# Patient Record
Sex: MALE | Race: WHITE | NOT HISPANIC OR LATINO
[De-identification: names, ages, dates, MRNs, and addresses within clinical notes are randomized per-mention and may not be internally consistent; named-entity substitution may affect disease eponyms.]

---

## 2021-04-05 ENCOUNTER — TRANSCRIPTION ENCOUNTER (OUTPATIENT)
Age: 56
End: 2021-04-05

## 2023-01-24 ENCOUNTER — OUTPATIENT (OUTPATIENT)
Dept: OUTPATIENT SERVICES | Facility: HOSPITAL | Age: 58
LOS: 1 days | Discharge: HOME | End: 2023-01-24
Payer: COMMERCIAL

## 2023-02-06 PROCEDURE — 90792 PSYCH DIAG EVAL W/MED SRVCS: CPT

## 2023-02-06 NOTE — ECT CONSULT NOTE - NSECTREASONCONSCOMMENTS_PSY_ALL_CORE
Pt interviewed via Teams telemed platform. Pt was at sister's house ([place]) who remained in room with his consent and MD was in private home/office.  Pt consented to video consult.

## 2023-02-06 NOTE — ECT CONSULT NOTE - NSECTASSESSRECOMM_PSY_ALL_CORE
A course of ECT is indicated for severe depression that has not resolved with trials of multiple medications.  Pt listened to info about ECT and understands risks/benefits including INCREASED RISK with current etoh use pattern.   Pt agrees to try stop drinking for 1 week (understanding he if has any symptoms of etoh withdrawal to contact PMD, psychiatrist and/or go to ER for medical attention).   If he is able to abstain for 1 week, he can go for medical clearance but expectation is that he would not drink (or at most 1 beer, not nights) during acute course of ECT.   Pt agrees to work with this plan.    The patient encounter was from 3:00p to 4:30p      More than 50% of the time was spent in counselling and/or coordination of care, including but not limited to discussing with patient and family risk and benefits of ECT, the usual trajectory of response with acute course of ECT, obtaining informed consent for ECT, reviewing ECT fasting guidelines, reviewing the need for periodic history and physical and labwork. Patient was asked to obtain labwork (CBC, BMP), EKG and medical clearance.  Referring psychiatrist was contacted.

## 2023-02-06 NOTE — ECT CONSULT NOTE - NSECTMENTALSTATUSEXAM_PSY_ALL_CORE
Conscious, cooperative, alert.   No psychomotor agitation/retardation.   Good eye contact.   Speech: regular rate and rhythm,   Mood: "Depressed" Affect: appropriate, full range.   Thought Process: linear, goal directed   Thought Content: No Death wish, Passive Suicidal ideation without intent/plan, No homicidal ideation/intent/plan. No delusions   Perception: Intermittent A/V hallucinations of father  Insight and Judgement: fair  Impulse Control: fair at this time.

## 2023-02-06 NOTE — ECT CONSULT NOTE - NSECTBILLINGCODES_PSY_ALL_CORE
81895 Psychiatric diagnostic evaluation with medical services [id number] Psychiatric diagnostic evaluation with medical services [id number] Psychiatric diagnostic evaluation with medical services
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
## 2023-02-06 NOTE — ECT CONSULT NOTE - CURRENT MEDICATION
MEDICATIONS  (STANDING): Rexulti 1mg daily, Cymbalta 120mg daily, Remeron 30mg at bed, Strattera ?mg daily (ran out 1 week ago, awaiting for Rx to be mailed, Lisinopril 10mg daily, Metoprolol 25mg daily, Omerprazole 40m daily, Flomax 0.4mg daily    No Benzos.

## 2023-02-06 NOTE — ECT CONSULT NOTE - DESCRIPTION
HTN, COPD (no h/o sleep study for RADHA but snores and gasps at times), Barretts Esophagus from GERD (not etoh per pt; no h/o known bleeds), enlarged prostate  NKDA  Dentita intact/stable

## 2023-03-28 LAB
ALBUMIN SERPL ELPH-MCNC: 4.8 G/DL — SIGNIFICANT CHANGE UP (ref 3.3–5)
ALP SERPL-CCNC: 92 U/L — SIGNIFICANT CHANGE UP (ref 40–120)
ALT FLD-CCNC: 41 U/L — SIGNIFICANT CHANGE UP (ref 4–41)
ANION GAP SERPL CALC-SCNC: 12 MMOL/L — SIGNIFICANT CHANGE UP (ref 7–14)
AST SERPL-CCNC: 29 U/L — SIGNIFICANT CHANGE UP (ref 4–40)
BASOPHILS # BLD AUTO: 0.06 K/UL — SIGNIFICANT CHANGE UP (ref 0–0.2)
BASOPHILS NFR BLD AUTO: 1 % — SIGNIFICANT CHANGE UP (ref 0–2)
BILIRUB SERPL-MCNC: 0.6 MG/DL — SIGNIFICANT CHANGE UP (ref 0.2–1.2)
BUN SERPL-MCNC: 11 MG/DL — SIGNIFICANT CHANGE UP (ref 7–23)
CALCIUM SERPL-MCNC: 10.1 MG/DL — SIGNIFICANT CHANGE UP (ref 8.4–10.5)
CHLORIDE SERPL-SCNC: 103 MMOL/L — SIGNIFICANT CHANGE UP (ref 98–107)
CO2 SERPL-SCNC: 26 MMOL/L — SIGNIFICANT CHANGE UP (ref 22–31)
CREAT SERPL-MCNC: 0.91 MG/DL — SIGNIFICANT CHANGE UP (ref 0.5–1.3)
EGFR: 98 ML/MIN/1.73M2 — SIGNIFICANT CHANGE UP
EOSINOPHIL # BLD AUTO: 0 K/UL — SIGNIFICANT CHANGE UP (ref 0–0.5)
EOSINOPHIL NFR BLD AUTO: 0 % — SIGNIFICANT CHANGE UP (ref 0–6)
GLUCOSE SERPL-MCNC: 103 MG/DL — HIGH (ref 70–99)
HCT VFR BLD CALC: 49 % — SIGNIFICANT CHANGE UP (ref 39–50)
HGB BLD-MCNC: 16.4 G/DL — SIGNIFICANT CHANGE UP (ref 13–17)
IANC: 3.93 K/UL — SIGNIFICANT CHANGE UP (ref 1.8–7.4)
IMM GRANULOCYTES NFR BLD AUTO: 0.3 % — SIGNIFICANT CHANGE UP (ref 0–0.9)
LYMPHOCYTES # BLD AUTO: 1.42 K/UL — SIGNIFICANT CHANGE UP (ref 1–3.3)
LYMPHOCYTES # BLD AUTO: 23.4 % — SIGNIFICANT CHANGE UP (ref 13–44)
MCHC RBC-ENTMCNC: 30.4 PG — SIGNIFICANT CHANGE UP (ref 27–34)
MCHC RBC-ENTMCNC: 33.5 GM/DL — SIGNIFICANT CHANGE UP (ref 32–36)
MCV RBC AUTO: 90.9 FL — SIGNIFICANT CHANGE UP (ref 80–100)
MONOCYTES # BLD AUTO: 0.63 K/UL — SIGNIFICANT CHANGE UP (ref 0–0.9)
MONOCYTES NFR BLD AUTO: 10.4 % — SIGNIFICANT CHANGE UP (ref 2–14)
NEUTROPHILS # BLD AUTO: 3.93 K/UL — SIGNIFICANT CHANGE UP (ref 1.8–7.4)
NEUTROPHILS NFR BLD AUTO: 64.9 % — SIGNIFICANT CHANGE UP (ref 43–77)
NRBC # BLD: 0 /100 WBCS — SIGNIFICANT CHANGE UP (ref 0–0)
NRBC # FLD: 0 K/UL — SIGNIFICANT CHANGE UP (ref 0–0)
PLATELET # BLD AUTO: 269 K/UL — SIGNIFICANT CHANGE UP (ref 150–400)
POTASSIUM SERPL-MCNC: 4.6 MMOL/L — SIGNIFICANT CHANGE UP (ref 3.5–5.3)
POTASSIUM SERPL-SCNC: 4.6 MMOL/L — SIGNIFICANT CHANGE UP (ref 3.5–5.3)
PROT SERPL-MCNC: 7.4 G/DL — SIGNIFICANT CHANGE UP (ref 6–8.3)
RBC # BLD: 5.39 M/UL — SIGNIFICANT CHANGE UP (ref 4.2–5.8)
RBC # FLD: 12.3 % — SIGNIFICANT CHANGE UP (ref 10.3–14.5)
SODIUM SERPL-SCNC: 141 MMOL/L — SIGNIFICANT CHANGE UP (ref 135–145)
WBC # BLD: 6.06 K/UL — SIGNIFICANT CHANGE UP (ref 3.8–10.5)
WBC # FLD AUTO: 6.06 K/UL — SIGNIFICANT CHANGE UP (ref 3.8–10.5)

## 2023-03-28 PROCEDURE — 93010 ELECTROCARDIOGRAM REPORT: CPT

## 2023-04-03 VITALS
OXYGEN SATURATION: 95 % | HEART RATE: 78 BPM | TEMPERATURE: 99 F | RESPIRATION RATE: 19 BRPM | DIASTOLIC BLOOD PRESSURE: 100 MMHG | SYSTOLIC BLOOD PRESSURE: 194 MMHG

## 2023-04-03 PROCEDURE — 90870 ELECTROCONVULSIVE THERAPY: CPT

## 2023-04-03 RX ORDER — MIDAZOLAM HYDROCHLORIDE 1 MG/ML
2 INJECTION, SOLUTION INTRAMUSCULAR; INTRAVENOUS ONCE
Refills: 0 | Status: DISCONTINUED | OUTPATIENT
Start: 2023-04-03 | End: 2023-04-03

## 2023-04-03 NOTE — ECT AMBULATORY DISCHARGE PLAN - NSPOSTECTCALLBEFORE_PSY_ALL_CORE
[facility] ([facility]) scheduling office at [phone] [facility] ([facility]) scheduling office at [phone] [facility] ([facility]) scheduling office at [phone]

## 2023-04-03 NOTE — ECT AMBULATORY DISCHARGE PLAN - PATIENT PORTAL LINK FT
You can access the FollowMyHealth Patient Portal offered by [facility] by registering at the following website: [email or web address]. By joining [employer]’s FollowMyHealth portal, you will also be able to view your health information using other applications (apps) compatible with our system. You can access the FollowMyHealth Patient Portal offered by [facility] by registering at the following website: [email or web address]. By joining [employer]’s FollowMyHealth portal, you will also be able to view your health information using other applications (apps) compatible with our system. You can access the FollowMyHealth Patient Portal offered by [facility] by registering at the following website: [email or web address]. By joining [employer]’s FollowMyHealth portal, you will also be able to view your health information using other applications (apps) compatible with our system.

## 2023-04-03 NOTE — ECT AMBULATORY DISCHARGE PLAN - NSPOSTECTCONTPROV_PSY_ALL_CORE
[facility] ([facility]) ECT Suite at [phone] [facility] ([facility]) ECT Suite at [phone] [facility] ([facility]) ECT Suite at [phone]

## 2023-04-03 NOTE — ECT AMBULATORY DISCHARGE PLAN - NSPOSTECTCALLZHH_PSY_ALL_CORE
Call the [facility] ECT suite at [phone] Call the [facility] ECT suite at [phone] Call the [facility] ECT suite at [phone]

## 2023-04-03 NOTE — ECT TREATMENT NOTE - NSECTCPTCODE_PSY_ALL_CORE
04179 (ECT-Electroconvulsive Therapy) 82057 (ECT-Electroconvulsive Therapy) 39013 (ECT-Electroconvulsive Therapy)

## 2023-04-03 NOTE — ECT AMBULATORY DISCHARGE PLAN - NSDCPEFALRISK_GEN_ALL_CORE
For information on Fall & Injury Prevention, visit: https://www.[facility]/news/fall-prevention-protects-and-maintains-health-and-mobility OR  https://www.[facility]/news/fall-prevention-tips-to-avoid-injury OR  https://www.cdc.gov/steadi/patient.html For information on Fall & Injury Prevention, visit: https://www.[facility]/news/fall-prevention-protects-and-maintains-health-and-mobility OR  https://www.[facility]/news/fall-prevention-tips-to-avoid-injury OR  https://www.cdc.gov/steadi/patient.html For information on Fall & Injury Prevention, visit: https://www.[facility]/news/fall-prevention-protects-and-maintains-health-and-mobility OR  https://www.[facility]/news/fall-prevention-tips-to-avoid-injury OR  https://www.cdc.gov/steadi/patient.html

## 2023-04-03 NOTE — ECT AMBULATORY DISCHARGE PLAN - PATIENT PORTAL LINK FT
You can access the FollowMyHealth Patient Portal offered by [facility] by registering at the following website: [email or web address]. By joining [employer]’s FollowMyHealth portal, you will also be able to view your health information using other applications (apps) compatible with our system. You can access the FollowMyHealth Patient Portal offered by [facility] by registering at the following website: [email or web address]. By joining [employer]’s FollowMyHealth portal, you will also be able to view your health information using other applications (apps) compatible with our system. You can access the FollowMyHealth Patient Portal offered by [facility] by registering at the following website: [email or web address]. By joining [employer]’s FollowMyHealth portal, you will also be able to view your health information using other applications (apps) compatible with our system.

## 2023-04-03 NOTE — ECT AMBULATORY DISCHARGE PLAN - NSDCPEFALRISK_GEN_ALL_CORE
For information on Fall & Injury Prevention, visit: https://www.[facility]/news/fall-prevention-protects-and-maintains-health-and-mobility OR  https://www.[facility]/news/fall-prevention-tips-to-avoid-injury OR  https://www.cdc.gov/steadi/patient.html For information on Fall & Injury Prevention, visit: https://www.[facility]/news/fall-prevention-protects-and-maintains-health-and-mobility OR  https://www.[facility]/news/fall-prevention-tips-to-avoid-injury OR  https://www.cdc.gov/steadi/patient.html For information on Fall & Injury Prevention, visit: https://www.[facility]/news/fall-prevention-protects-and-maintains-health-and-mobility OR  https://www.[facility]/news/fall-prevention-tips-to-avoid-injury OR  https://www.cdc.gov/steadi/patient.html

## 2023-04-06 PROCEDURE — 90870 ELECTROCONVULSIVE THERAPY: CPT

## 2023-04-06 NOTE — ECT AMBULATORY DISCHARGE PLAN - PATIENT PORTAL LINK FT
You can access the FollowMyHealth Patient Portal offered by [facility] by registering at the following website: [email or web address]. By joining [employer]’s FollowMyHealth portal, you will also be able to view your health information using other applications (apps) compatible with our system. You can access the FollowMyHealth Patient Portal offered by [facility] by registering at the following website: [email or web address]://[facility]/[email or web address]. By joining [employer]’s FollowMyHealth portal, you will also be able to view your health information using other applications (apps) compatible with our system. You can access the FollowMyHealth Patient Portal offered by [facility] by registering at the following website: [email or web address]. By joining [employer]’s FollowMyHealth portal, you will also be able to view your health information using other applications (apps) compatible with our system.

## 2023-04-06 NOTE — ECT AMBULATORY DISCHARGE PLAN - NSPOSTECTCALLBEFORE_PSY_ALL_CORE
[facility] ([facility]) scheduling office at [phone] [facility] ([facility]) scheduling office at [phone] Health system ([facility]) scheduling office at [phone]

## 2023-04-06 NOTE — ECT TREATMENT NOTE - NSECTCPTCODE_PSY_ALL_CORE
27625 (ECT-Electroconvulsive Therapy) 02350 (ECT-Electroconvulsive Therapy) 27240 (ECT-Electroconvulsive Therapy)

## 2023-04-06 NOTE — ECT AMBULATORY DISCHARGE PLAN - NSPOSTECTCONTPROV_PSY_ALL_CORE
[facility] ([facility]) ECT Suite at [phone] [facility] ([facility]) ECT Suite at [phone] [facility] ([facility]) ECT Suite at [phone]

## 2023-04-06 NOTE — ECT AMBULATORY DISCHARGE PLAN - NSPOSTECTCALLZHH_PSY_ALL_CORE
Call the [facility] ECT suite at [phone] Call the [facility] ECT suite at [phone] Call the [facility] ECT suite at [phone]

## 2023-04-06 NOTE — ECT AMBULATORY DISCHARGE PLAN - NSDCPEFALRISK_GEN_ALL_CORE
For information on Fall & Injury Prevention, visit: https://www.[facility]/news/fall-prevention-protects-and-maintains-health-and-mobility OR  https://www.[facility]/news/fall-prevention-tips-to-avoid-injury OR  https://www.cdc.gov/steadi/patient.html For information on Fall & Injury Prevention, visit: https://www.[facility]/news/fall-prevention-protects-and-maintains-health-and-mobility OR  https://www.[facility]/news/fall-prevention-tips-to-avoid-injury OR  https://www.cdc.gov/steadi/patient.html For information on Fall & Injury Prevention, visit: https://www.[facility]/news/fall-prevention-protects-and-maintains-health-and-mobility OR  https://www.[facility]/news/fall-prevention-tips-to-avoid-injury OR  https://www.cdc.gov/steadi/patient.html

## 2023-04-07 DIAGNOSIS — F33.2 MAJOR DEPRESSIVE DISORDER, RECURRENT SEVERE WITHOUT PSYCHOTIC FEATURES: ICD-10-CM

## 2023-04-11 PROCEDURE — 90870 ELECTROCONVULSIVE THERAPY: CPT

## 2023-04-11 NOTE — ECT AMBULATORY DISCHARGE PLAN - NSPOSTECTCALLBEFORE_PSY_ALL_CORE
[facility] ([facility]) scheduling office at [phone] [facility] ([facility]) scheduling office at [phone] [facility] ([facility]) scheduling office at [phone]

## 2023-04-11 NOTE — ECT TREATMENT NOTE - NSECTCPTCODE_PSY_ALL_CORE
72925 (ECT-Electroconvulsive Therapy) 46518 (ECT-Electroconvulsive Therapy) 54080 (ECT-Electroconvulsive Therapy)

## 2023-04-11 NOTE — ECT AMBULATORY DISCHARGE PLAN - PATIENT PORTAL LINK FT
You can access the FollowMyHealth Patient Portal offered by [facility] by registering at the following website: [email or web address]. By joining [employer]’s FollowMyHealth portal, you will also be able to view your health information using other applications (apps) compatible with our system. You can access the FollowMyHealth Patient Portal offered by [facility] by registering at the following website: [email or web address]. By joining [employer]’s FollowMyHealth portal, you will also be able to view your health information using other applications (apps) compatible with our system. You can access the FollowMyHealth Patient Portal offered by [facility] by registering at the following website: [email or web address]. By joining [employer]’s FollowMyHealth portal, you will also be able to view your health information using other applications (apps) compatible with our system.

## 2023-04-11 NOTE — ECT AMBULATORY DISCHARGE PLAN - NSPOSTECTCALLZHH_PSY_ALL_CORE
Call the [facility] ECT suite at [phone] Call the [facility] ECT suite at [phone] Call the [facility] ECT suite at [phone]

## 2023-04-11 NOTE — ECT AMBULATORY DISCHARGE PLAN - NSDCPEFALRISK_GEN_ALL_CORE
For information on Fall & Injury Prevention, visit: https://www.[facility]/news/fall-prevention-protects-and-maintains-health-and-mobility OR  https://www.[facility]/news/fall-prevention-tips-to-avoid-injury OR  https://www.cdc.gov/steadi/patient.html For information on Fall & Injury Prevention, visit: https://www.[facility]/news/fall-prevention-protects-and-maintains-health-and-mobility OR  https://www.[facility]/news/fall-prevention-tips-to-avoid-injury OR  https://www.cdc.gov/steadi/patient.html For information on Fall & Injury Prevention, visit: https://www.[facility]/news/fall-prevention-protects-and-maintains-health-and-mobility OR  https://www.[facility]/news/fall-prevention-tips-to-avoid-injury OR  https://www.cdc.gov/steadi/patient.html

## 2023-04-11 NOTE — ECT AMBULATORY DISCHARGE PLAN - NSPOSTECTCONTPROV_PSY_ALL_CORE
[facility] ([facility]) ECT Suite at [phone] [facility] ([facility]) ECT Suite at [phone] [facility] ([facility]) ECT Suite at [phone]

## 2023-04-14 NOTE — ECT AMBULATORY DISCHARGE PLAN - NSPOSTECTCONTPROV_PSY_ALL_CORE
[facility] ([facility]) ECT Suite at [phone] [facility] ([facility]) ECT Suite at [phone] [facility] ([facility]) ECT Suite at [phone]

## 2023-04-14 NOTE — ECT TREATMENT NOTE - NSECTCPTCODE_PSY_ALL_CORE
24099 (ECT-Electroconvulsive Therapy) 03842 (ECT-Electroconvulsive Therapy) 81380 (ECT-Electroconvulsive Therapy)

## 2023-04-14 NOTE — ECT AMBULATORY DISCHARGE PLAN - PATIENT PORTAL LINK FT
You can access the FollowMyHealth Patient Portal offered by [facility] by registering at the following website: [email or web address]. By joining [employer]’s FollowMyHealth portal, you will also be able to view your health information using other applications (apps) compatible with our system. You can access the FollowMyHealth Patient Portal offered by [facility] by registering at the following website: [email or web address]. By joining [employer]’s FollowMyHealth portal, you will also be able to view your health information using other applications (apps) compatible with our system. You can access the FollowMyHealth Patient Portal offered by [facility] by registering at the following website: [email or web address]. By joining [employer]’s FollowMyHealth portal, you will also be able to view your health information using other applications (apps) compatible with our system.

## 2023-04-14 NOTE — ECT AMBULATORY DISCHARGE PLAN - NSDCPEFALRISK_GEN_ALL_CORE
For information on Fall & Injury Prevention, visit: https://www.[facility]/news/fall-prevention-protects-and-maintains-health-and-mobility OR  https://www.[facility] [place]/news/fall-prevention-tips-to-avoid-injury OR  https://www.cdc.gov/steadi/patient.html For information on Fall & Injury Prevention, visit: https://www.[facility]/news/fall-prevention-protects-and-maintains-health-and-mobility OR  https://www.[facility]/news/fall-prevention-tips-to-avoid-injury OR  https://www.cdc.gov/steadi/patient.html For information on Fall & Injury Prevention, visit: https://www.[facility]/news/fall-prevention-protects-and-maintains-health-and-mobility OR  https://www.[facility]/news/fall-prevention-tips-to-avoid-injury OR  https://www.cdc.gov/steadi/patient.html

## 2023-04-14 NOTE — ECT AMBULATORY DISCHARGE PLAN - NSPOSTECTCALLBEFORE_PSY_ALL_CORE
[facility] ([facility]) scheduling office at [phone] [facility] ([facility]) scheduling office at [phone] [facility] ([facility]) scheduling office at [phone]

## 2023-04-14 NOTE — ECT AMBULATORY DISCHARGE PLAN - NSPOSTECTCALLZHH_PSY_ALL_CORE
Call the [facility] ECT suite at [phone] Call the [facility] ECT suite at [phone] Call the [facility] ECT suite at [phone]

## 2023-04-18 PROCEDURE — 90870 ELECTROCONVULSIVE THERAPY: CPT

## 2023-04-18 NOTE — ECT AMBULATORY DISCHARGE PLAN - NSDCPEFALRISK_GEN_ALL_CORE
For information on Fall & Injury Prevention, visit: https://www.[facility]/news/fall-prevention-protects-and-maintains-health-and-mobility OR  https://www.[facility]/news/fall-prevention-tips-to-avoid-injury OR  https://www.cdc.gov/steadi/patient.html For information on Fall & Injury Prevention, visit: https://www.[facility]/news/fall-prevention-protects-and-maintains-health-and-mobility OR  https://www.[facility]/news/fall-prevention-tips-to-avoid-injury OR  https://www.cdc.gov/steadi/patient.html For information on Fall & Injury Prevention, visit: https://www.[facility]/news/fall-prevention-protects-and-maintains-health-and-mobility OR  https://www.[facility]/news/fall-prevention-tips-to-avoid-injury OR  https://www.cdc.gov/steadi/patient.html

## 2023-04-18 NOTE — ECT AMBULATORY DISCHARGE PLAN - NSPOSTECTCALLZHH_PSY_ALL_CORE
Call the [facility] ECT suite at [phone] Call the [facility] ECT suite at [phone] Call the [facility] ECT suite at [phone]

## 2023-04-18 NOTE — ECT AMBULATORY DISCHARGE PLAN - PATIENT PORTAL LINK FT
You can access the FollowMyHealth Patient Portal offered by [facility] by registering at the following website: [email or web address]. By joining [employer]’s FollowMyHealth portal, you will also be able to view your health information using other applications (apps) compatible with our system. You can access the FollowMyHealth Patient Portal offered by [facility] by registering at the following website: [email or web address]. By joining [employer]’s FollowMyHealth portal, you will also be able to view your health information using other applications (apps) compatible with our system. You can access the FollowMyHealth Patient Portal offered by [facility] by registering at the following website: [email or web address]. By joining [employer]’s FollowMyHealth portal, you will also be able to view your health information using other applications (apps) compatible with our system.

## 2023-04-18 NOTE — ECT AMBULATORY DISCHARGE PLAN - NSPOSTECTCALLBEFORE_PSY_ALL_CORE
[facility] ([facility]) scheduling office at [phone] [facility] ([facility]) scheduling office at [phone] [facility] ([facility]) scheduling office at [phone]

## 2023-04-18 NOTE — ECT TREATMENT NOTE - NSECTCPTCODE_PSY_ALL_CORE
27862 (ECT-Electroconvulsive Therapy) 72803 (ECT-Electroconvulsive Therapy) 09037 (ECT-Electroconvulsive Therapy)

## 2023-04-18 NOTE — ECT TREATMENT NOTE - NSECTPOSTTXCOMMENTS_PSY_ALL_CORE
Energy increased based on prior note of need for increased stimulus dose.  Had good quality seizure duration and morphology with this stimulus dose.  
SUGGEST INCREASED ENERGY AT NEXT TX FOR TREND TO SHORTER SEIZURE DURATION

## 2023-04-18 NOTE — ECT AMBULATORY DISCHARGE PLAN - NSPOSTECTCONTPROV_PSY_ALL_CORE
[facility] ([facility]) ECT Suite at [phone] [facility] ([facility]) ECT Suite at [phone] [facility] ([facility]) ECT Suite at [phone]

## 2023-04-21 PROCEDURE — 90870 ELECTROCONVULSIVE THERAPY: CPT

## 2023-04-21 NOTE — ECT TREATMENT NOTE - NSECTCPTCODE_PSY_ALL_CORE
36934 (ECT-Electroconvulsive Therapy) 64311 (ECT-Electroconvulsive Therapy) 28034 (ECT-Electroconvulsive Therapy)

## 2023-04-21 NOTE — ECT AMBULATORY DISCHARGE PLAN - NSPOSTECTCONTPROV_PSY_ALL_CORE
Detail Level: Zone Detail Level: Generalized Detail Level: Detailed [facility] ([facility]) ECT Suite at [phone] [facility] ([facility]) ECT Suite at [phone] [facility] ([facility]) ECT Suite at [phone]

## 2023-04-21 NOTE — ECT AMBULATORY DISCHARGE PLAN - NSPOSTECTCALLZHH_PSY_ALL_CORE
Call the [facility] ECT suite at [phone] Call the [facility] ECT suite at [phone] Call the [facility] ECT suite at [phone]

## 2023-04-21 NOTE — ECT AMBULATORY DISCHARGE PLAN - NSDCPEFALRISK_GEN_ALL_CORE
For information on Fall & Injury Prevention, visit: https://www.[facility]/news/fall-prevention-protects-and-maintains-health-and-mobility OR  https://www.[facility]/news/fall-prevention-tips-to-avoid-injury OR  https://www.cdc.gov/steadi/patient.html For information on Fall & Injury Prevention, visit: https://www.[facility]/news/fall-prevention-protects-and-maintains-health-and-mobility OR  https://www.[facility]/news/fall-prevention-tips-to-avoid-injury OR  https://www.cdc.gov/steadi/patient.html For information on Fall & Injury Prevention, visit: https://www.[facility]/news/fall-prevention-protects-and-maintains-health-and-mobility OR  https://www.[facility]/news/fall-prevention-tips-to-avoid-injury OR  https://www.cdc.gov/steadi/patient.html

## 2023-04-21 NOTE — ECT AMBULATORY DISCHARGE PLAN - PATIENT PORTAL LINK FT
You can access the FollowMyHealth Patient Portal offered by [facility] by registering at the following website: [email or web address]. By joining [employer]’s FollowMyHealth portal, you will also be able to view your health information using other applications (apps) compatible with our system. You can access the FollowMyHealth Patient Portal offered by [facility] by registering at the following website: http://[facility]/followmyhealth. By joining [employer]’s FollowMyHealth portal, you will also be able to view your health information using other applications (apps) compatible with our system. You can access the FollowMyHealth Patient Portal offered by [facility] by registering at the following website: [email or web address]. By joining [employer]’s FollowMyHealth portal, you will also be able to view your health information using other applications (apps) compatible with our system.

## 2023-04-21 NOTE — ECT AMBULATORY DISCHARGE PLAN - NSPOSTECTCALLBEFORE_PSY_ALL_CORE
[facility] ([facility]) scheduling office at [phone] [facility] ([facility]) scheduling office at [phone] [facility] ([facility]) scheduling office at [phone]

## 2023-04-26 PROCEDURE — 90870 ELECTROCONVULSIVE THERAPY: CPT

## 2023-04-26 NOTE — ECT AMBULATORY DISCHARGE PLAN - NSPOSTECTCONTPROV_PSY_ALL_CORE
[facility] ([facility]) ECT Suite at [phone] [facility] ([facility]) ECT Suite at [phone] [facility] ([facility]) ECT Suite at [phone]

## 2023-04-26 NOTE — ECT TREATMENT NOTE - NSECTCPTCODE_PSY_ALL_CORE
38997 (ECT-Electroconvulsive Therapy) 14396 (ECT-Electroconvulsive Therapy) 97135 (ECT-Electroconvulsive Therapy)

## 2023-04-26 NOTE — ECT AMBULATORY DISCHARGE PLAN - NSPOSTECTCALLZHH_PSY_ALL_CORE
Call the [facility] ECT suite at [phone] Call the [facility] ECT suite at [phone] Call the [facility] ECT suite at [phone]

## 2023-04-26 NOTE — ECT AMBULATORY DISCHARGE PLAN - PATIENT PORTAL LINK FT
You can access the FollowMyHealth Patient Portal offered by [facility] by registering at the following website: http://[facility]/followmyhealth. By joining [employer]’s FollowMyHealth portal, you will also be able to view your health information using other applications (apps) compatible with our system. You can access the FollowMyHealth Patient Portal offered by [facility] by registering at the following website: [email or web address]. By joining [employer]’s FollowMyHealth portal, you will also be able to view your health information using other applications (apps) compatible with our system. You can access the FollowMyHealth Patient Portal offered by [facility] by registering at the following website: [email or web address]. By joining [employer]’s FollowMyHealth portal, you will also be able to view your health information using other applications (apps) compatible with our system.

## 2023-04-26 NOTE — ECT AMBULATORY DISCHARGE PLAN - NSDCPEFALRISK_GEN_ALL_CORE
For information on Fall & Injury Prevention, visit: https://www.[facility]/news/fall-prevention-protects-and-maintains-health-and-mobility OR  https://www.[facility]/news/fall-prevention-tips-to-avoid-injury OR  https://www.cdc.gov/steadi/patient.html For information on Fall & Injury Prevention, visit: https://www.[facility]/news/fall-prevention-protects-and-maintains-health-and-mobility OR  https://www.[facility]/news/fall-prevention-tips-to-avoid-injury OR  https://www.cdc.gov/steadi/patient.html For information on Fall & Injury Prevention, visit: https://www.[facility]/news/fall-prevention-protects-and-maintains-health-and-mobility OR  https://www.[facility]/news/fall-prevention-tips-to-avoid-injury OR  https://www.cdc.gov/steadi/patient.html

## 2023-04-26 NOTE — ECT AMBULATORY DISCHARGE PLAN - NSPOSTECTCALLBEFORE_PSY_ALL_CORE
[facility] ([facility]) scheduling office at [phone] [facility] ([facility]) scheduling office at [phone] [facility] ([facility]) scheduling office at [phone]

## 2023-04-28 PROCEDURE — 90870 ELECTROCONVULSIVE THERAPY: CPT

## 2023-04-28 NOTE — ECT AMBULATORY DISCHARGE PLAN - NSDCPEFALRISK_GEN_ALL_CORE
For information on Fall & Injury Prevention, visit: https://www.[facility]/news/fall-prevention-protects-and-maintains-health-and-mobility OR  https://www.[facility]/news/fall-prevention-tips-to-avoid-injury OR  https://www.cdc.gov/steadi/patient.html For information on Fall & Injury Prevention, visit: https://www.[facility]/news/fall-prevention-protects-and-maintains-health-and-mobility OR  https://www.[facility]/news/fall-prevention-tips-to-avoid-injury OR  https://www.cdc.gov/steadi/patient.html For information on Fall & Injury Prevention, visit: https://www.[facility]/news/fall-prevention-protects-and-maintains-health-and-mobility OR  https://www.[facility]/news/fall-prevention-tips-to-avoid-injury OR  https://www.cdc.gov/steadi/patient.html

## 2023-04-28 NOTE — ECT AMBULATORY DISCHARGE PLAN - PATIENT PORTAL LINK FT
You can access the FollowMyHealth Patient Portal offered by [facility] by registering at the following website: [email or web address]. By joining [employer]’s FollowMyHealth portal, you will also be able to view your health information using other applications (apps) compatible with our system. You can access the FollowMyHealth Patient Portal offered by [facility] by registering at the following website: [email or web address]://[email or web address]. By joining [employer]’s FollowMyHealth portal, you will also be able to view your health information using other applications (apps) compatible with our system. You can access the FollowMyHealth Patient Portal offered by [facility] by registering at the following website: [email or web address]. By joining [employer]’s FollowMyHealth portal, you will also be able to view your health information using other applications (apps) compatible with our system.

## 2023-04-28 NOTE — ECT AMBULATORY DISCHARGE PLAN - NSPOSTECTCALLZHH_PSY_ALL_CORE
Call the [facility] ECT suite at [phone] Call the [facility] ECT suite at [phone] Call the [facility] ECT suite at [phone]

## 2023-04-28 NOTE — ECT AMBULATORY DISCHARGE PLAN - NSPOSTECTCALLBEFORE_PSY_ALL_CORE
[facility] ([facility]) scheduling office at [phone] [facility] ([facility]) scheduling office at [phone] [facility] ([facility]) scheduling office at [phone]

## 2023-04-28 NOTE — ECT AMBULATORY DISCHARGE PLAN - NSPOSTECTCONTPROV_PSY_ALL_CORE
[facility] ([facility]) ECT Suite at [phone] [facility] ([facility]) ECT Suite at [phone] [facility] ([facility]) ECT Suite at [phone]

## 2023-04-28 NOTE — ECT TREATMENT NOTE - NSECTCPTCODE_PSY_ALL_CORE
63060 (ECT-Electroconvulsive Therapy) 59772 (ECT-Electroconvulsive Therapy) 88614 (ECT-Electroconvulsive Therapy)

## 2023-05-03 PROCEDURE — 90870 ELECTROCONVULSIVE THERAPY: CPT

## 2023-05-03 RX ORDER — MIDAZOLAM HYDROCHLORIDE 1 MG/ML
2 INJECTION, SOLUTION INTRAMUSCULAR; INTRAVENOUS ONCE
Refills: 0 | Status: DISCONTINUED | OUTPATIENT
Start: 2023-05-03 | End: 2023-05-03

## 2023-05-03 RX ADMIN — MIDAZOLAM HYDROCHLORIDE 2 MILLIGRAM(S): 1 INJECTION, SOLUTION INTRAMUSCULAR; INTRAVENOUS at 08:45

## 2023-05-03 NOTE — ECT AMBULATORY DISCHARGE PLAN - NSDCPEFALRISK_GEN_ALL_CORE
For information on Fall & Injury Prevention, visit: https://www.[facility]/news/fall-prevention-protects-and-maintains-health-and-mobility OR  https://www.[facility]/news/fall-prevention-tips-to-avoid-injury OR  https://www.cdc.gov/steadi/patient.html For information on Fall & Injury Prevention, visit: https://www.[facility]/news/fall-prevention-protects-and-maintains-health-and-mobility OR  https://www.[facility]/news/fall-prevention-tips-to-avoid-injury OR  https://www.cdc.gov/steadi/patient.html For information on Fall & Injury Prevention, visit: https://www.[facility]/news/fall-prevention-protects-and-maintains-health-and-mobility OR  https://www.[facility]/news/fall-prevention-tips-to-avoid-injury OR  https://www.cdc.gov/steadi/patient.html

## 2023-05-03 NOTE — ECT AMBULATORY DISCHARGE PLAN - NSPOSTECTCALLZHH_PSY_ALL_CORE
Call the [facility] ECT suite at [phone] Call the [facility] ECT suite at [phone] Call the [facility] ECT suite at [phone]

## 2023-05-03 NOTE — ECT AMBULATORY DISCHARGE PLAN - NSPOSTECTCALLBEFORE_PSY_ALL_CORE
[facility] ([facility]) scheduling office at [phone] [facility] ([facility]) scheduling office at [phone] [facility] ([facility]) scheduling office at [phone]

## 2023-05-03 NOTE — ECT TREATMENT NOTE - NSECTCPTCODE_PSY_ALL_CORE
92960 (ECT-Electroconvulsive Therapy) 75643 (ECT-Electroconvulsive Therapy) 23134 (ECT-Electroconvulsive Therapy)

## 2023-05-03 NOTE — ECT AMBULATORY DISCHARGE PLAN - NSPOSTECTCONTPROV_PSY_ALL_CORE
[facility] ([facility]) ECT Suite at [phone] [facility] ([facility]) ECT Suite at [phone] [facility] ([facility]) ECT Suite at [phone]

## 2023-05-03 NOTE — ECT AMBULATORY DISCHARGE PLAN - PATIENT PORTAL LINK FT
You can access the FollowMyHealth Patient Portal offered by [facility] by registering at the following website: [email or web address]://[email or web address]. By joining [employer]’s FollowMyHealth portal, you will also be able to view your health information using other applications (apps) compatible with our system. You can access the FollowMyHealth Patient Portal offered by [facility] by registering at the following website: [email or web address]://[facility]/[email or web address]. By joining [employer]’s FollowMyHealth portal, you will also be able to view your health information using other applications (apps) compatible with our system. You can access the FollowMyHealth Patient Portal offered by [facility] by registering at the following website: [email or web address]://[email or web address]. By joining [employer]’s FollowMyHealth portal, you will also be able to view your health information using other applications (apps) compatible with our system.

## 2023-05-09 PROCEDURE — 90870 ELECTROCONVULSIVE THERAPY: CPT

## 2023-05-09 RX ORDER — MIDAZOLAM HYDROCHLORIDE 1 MG/ML
2 INJECTION, SOLUTION INTRAMUSCULAR; INTRAVENOUS ONCE
Refills: 0 | Status: DISCONTINUED | OUTPATIENT
Start: 2023-05-09 | End: 2023-05-09

## 2023-05-09 RX ADMIN — MIDAZOLAM HYDROCHLORIDE 2 MILLIGRAM(S): 1 INJECTION, SOLUTION INTRAMUSCULAR; INTRAVENOUS at 09:14

## 2023-05-09 NOTE — ECT AMBULATORY DISCHARGE PLAN - PATIENT PORTAL LINK FT
You can access the FollowMyHealth Patient Portal offered by [facility] by registering at the following website: [email or web address]. By joining [employer]’s FollowMyHealth portal, you will also be able to view your health information using other applications (apps) compatible with our system. You can access the FollowMyHealth Patient Portal offered by [facility] by registering at the following website: [email or web address]. By joining [employer]’s FollowMyHealth portal, you will also be able to view your health information using other applications (apps) compatible with our system. You can access the FollowMyHealth Patient Portal offered by [facility] by registering at the following website: http://[facility]/followmyhealth. By joining [employer]’s FollowMyHealth portal, you will also be able to view your health information using other applications (apps) compatible with our system.

## 2023-05-09 NOTE — ECT TREATMENT NOTE - NSECTCPTCODE_PSY_ALL_CORE
03780 (ECT-Electroconvulsive Therapy) 06397 (ECT-Electroconvulsive Therapy) 25366 (ECT-Electroconvulsive Therapy)

## 2023-05-09 NOTE — ECT AMBULATORY DISCHARGE PLAN - NSDCPEFALRISK_GEN_ALL_CORE
For information on Fall & Injury Prevention, visit: https://www.[facility]/news/fall-prevention-protects-and-maintains-health-and-mobility OR  https://www.[facility]/news/fall-prevention-tips-to-avoid-injury OR  https://www.cdc.gov/steadi/patient.html For information on Fall & Injury Prevention, visit: https://www.[facility]/news/fall-prevention-protects-and-maintains-health-and-mobility OR  https://www.[facility]/news/fall-prevention-tips-to-avoid-injury OR  https://www.cdc.gov/steadi/patient.html For information on Fall & Injury Prevention, visit: https://www.[facility]/news/fall-prevention-protects-and-maintains-health-and-mobility OR  https://www.[facility]/news/fall-prevention-tips-to-avoid-injury OR  https://www.cdc.gov/steadi/patient.html

## 2023-05-09 NOTE — ECT AMBULATORY DISCHARGE PLAN - NSPOSTECTCALLZHH_PSY_ALL_CORE
Call the [facility] ECT suite at [phone] Call the [facility] ECT suite at [phone] Call the [facility] ECT suite at [phone]

## 2023-05-09 NOTE — ECT PRE-PROCEDURE CHECKLIST - NSECTCONSENT_PSY_ALL_CORE
ECT consent (bifrontal) obtained 4/6/23  Anesthesia consent exp 7/3/23/yes ECT consent (bifrontal) obtained 5/9/23  Anesthesia consent exp 7/3/23/yes

## 2023-05-09 NOTE — ECT AMBULATORY DISCHARGE PLAN - NSPOSTECTCONTPROV_PSY_ALL_CORE
[facility] ([facility]) ECT Suite at [phone] [facility] ([facility]) ECT Suite at [phone] [facility] ([facility]) ECT Suite at [phone]

## 2023-05-09 NOTE — ECT AMBULATORY DISCHARGE PLAN - NSPOSTECTCALLBEFORE_PSY_ALL_CORE
[facility] ([facility]) scheduling office at [phone] [facility] ([facility]) scheduling office at [phone] [facility] ([facility]) scheduling office at [phone]

## 2023-05-12 PROCEDURE — 90870 ELECTROCONVULSIVE THERAPY: CPT

## 2023-05-12 RX ORDER — MIDAZOLAM HYDROCHLORIDE 1 MG/ML
2 INJECTION, SOLUTION INTRAMUSCULAR; INTRAVENOUS ONCE
Refills: 0 | Status: DISCONTINUED | OUTPATIENT
Start: 2023-05-12 | End: 2023-05-12

## 2023-05-12 RX ADMIN — MIDAZOLAM HYDROCHLORIDE 2 MILLIGRAM(S): 1 INJECTION, SOLUTION INTRAMUSCULAR; INTRAVENOUS at 09:26

## 2023-05-12 NOTE — ECT AMBULATORY DISCHARGE PLAN - NSPOSTECTCALLBEFORE_PSY_ALL_CORE
[facility] ([facility]) scheduling office at [phone] [facility] ([facility]) scheduling office at [phone] [facility] ([facility]) scheduling office at [phone]

## 2023-05-12 NOTE — ECT TREATMENT NOTE - NSECTCPTCODE_PSY_ALL_CORE
66071 (ECT-Electroconvulsive Therapy) 00560 (ECT-Electroconvulsive Therapy) 95726 (ECT-Electroconvulsive Therapy)

## 2023-05-12 NOTE — ECT AMBULATORY DISCHARGE PLAN - NSDCPEFALRISK_GEN_ALL_CORE
For information on Fall & Injury Prevention, visit: https://www.[facility]/news/fall-prevention-protects-and-maintains-health-and-mobility OR  https://www.[facility]/news/fall-prevention-tips-to-avoid-injury OR  https://www.cdc.gov/steadi/patient.html For information on Fall & Injury Prevention, visit: https://www.[facility]/news/fall-prevention-protects-and-maintains-health-and-mobility OR  https://www.[facility]/news/fall-prevention-tips-to-avoid-injury OR  https://www.cdc.gov/steadi/patient.html For information on Fall & Injury Prevention, visit: https://www.[facility]/news/fall-prevention-protects-and-maintains-health-and-mobility OR  https://www.[facility]/news/fall-prevention-tips-to-avoid-injury OR  https://www.cdc.gov/steadi/patient.html

## 2023-05-12 NOTE — ECT AMBULATORY DISCHARGE PLAN - PATIENT PORTAL LINK FT
You can access the FollowMyHealth Patient Portal offered by [facility] by registering at the following website: [email or web address]. By joining [employer]’s FollowMyHealth portal, you will also be able to view your health information using other applications (apps) compatible with our system. You can access the FollowMyHealth Patient Portal offered by [facility] by registering at the following website: [email or web address]. By joining [employer]’s FollowMyHealth portal, you will also be able to view your health information using other applications (apps) compatible with our system. You can access the FollowMyHealth Patient Portal offered by [facility] by registering at the following website: [email or web address]. By joining [employer]’s FollowMyHealth portal, you will also be able to view your health information using other applications (apps) compatible with our system.

## 2023-05-12 NOTE — ECT AMBULATORY DISCHARGE PLAN - NSPOSTECTCALLZHH_PSY_ALL_CORE
Call the [facility] ECT suite at [phone] Call the [facility] ECT suite at [phone] Call the [facility] ECT suite at [phone]

## 2023-05-17 PROCEDURE — 90870 ELECTROCONVULSIVE THERAPY: CPT

## 2023-05-17 RX ORDER — MIDAZOLAM HYDROCHLORIDE 1 MG/ML
2 INJECTION, SOLUTION INTRAMUSCULAR; INTRAVENOUS ONCE
Refills: 0 | Status: DISCONTINUED | OUTPATIENT
Start: 2023-05-17 | End: 2023-05-17

## 2023-05-17 RX ADMIN — MIDAZOLAM HYDROCHLORIDE 2 MILLIGRAM(S): 1 INJECTION, SOLUTION INTRAMUSCULAR; INTRAVENOUS at 08:59

## 2023-05-17 NOTE — ECT AMBULATORY DISCHARGE PLAN - NSDCPEFALRISK_GEN_ALL_CORE
For information on Fall & Injury Prevention, visit: https://www.[facility]/news/fall-prevention-protects-and-maintains-health-and-mobility OR  https://www.[facility]/news/fall-prevention-tips-to-avoid-injury OR  https://www.cdc.gov/steadi/patient.html For information on Fall & Injury Prevention, visit: https://www.[facility]/news/fall-prevention-protects-and-maintains-health-and-mobility OR  https://www.[facility]/news/fall-prevention-tips-to-avoid-injury OR  https://www.cdc.gov/steadi/patient.html For information on Fall & Injury Prevention, visit: https://www.[facility]/news/fall-prevention-protects-and-maintains-health-and-mobility OR  https://www.[facility]/news/fall-prevention-tips-to-avoid-injury OR  https://www.cdc.gov/steadi/patient.html

## 2023-05-17 NOTE — ECT AMBULATORY DISCHARGE PLAN - NSPOSTECTCALLBEFORE_PSY_ALL_CORE
[facility] ([facility]) scheduling office at [phone] [facility] ([facility]) scheduling office at [phone] [facility] ([facility]) scheduling office at [phone]

## 2023-05-17 NOTE — ECT AMBULATORY DISCHARGE PLAN - PATIENT PORTAL LINK FT
You can access the FollowMyHealth Patient Portal offered by [facility] by registering at the following website: [email or web address]. By joining [employer]’s FollowMyHealth portal, you will also be able to view your health information using other applications (apps) compatible with our system. You can access the FollowMyHealth Patient Portal offered by [facility] by registering at the following website: [email or web address]. By joining [employer]’s FollowMyHealth portal, you will also be able to view your health information using other applications (apps) compatible with our system. You can access the FollowMyHealth Patient Portal offered by [facility] by registering at the following website: [email or web address]. By joining [employer]’s FollowMyHealth portal, you will also be able to view your health information using other applications (apps) compatible with our system.

## 2023-05-17 NOTE — ECT AMBULATORY DISCHARGE PLAN - NSPOSTECTCALLZHH_PSY_ALL_CORE
Call the [facility] ECT suite at [phone] Call the [facility] ECT suite at [phone] Call the [facility] ECT suite at [phone]

## 2023-05-17 NOTE — ECT AMBULATORY DISCHARGE PLAN - NSPOSTECTCONTPROV_PSY_ALL_CORE
[facility] ([facility]) ECT Suite at [phone] [facility] ([facility]) ECT Suite at [phone] [facility] ([facility]) ECT Suite at [phone]

## 2023-05-19 PROCEDURE — 90870 ELECTROCONVULSIVE THERAPY: CPT

## 2023-05-19 RX ORDER — MIDAZOLAM HYDROCHLORIDE 1 MG/ML
2 INJECTION, SOLUTION INTRAMUSCULAR; INTRAVENOUS ONCE
Refills: 0 | Status: DISCONTINUED | OUTPATIENT
Start: 2023-05-19 | End: 2023-05-19

## 2023-05-19 RX ADMIN — MIDAZOLAM HYDROCHLORIDE 2 MILLIGRAM(S): 1 INJECTION, SOLUTION INTRAMUSCULAR; INTRAVENOUS at 09:09

## 2023-05-19 NOTE — ECT AMBULATORY DISCHARGE PLAN - NSDCPEFALRISK_GEN_ALL_CORE
For information on Fall & Injury Prevention, visit: https://www.[facility]/news/fall-prevention-protects-and-maintains-health-and-mobility OR  https://www.[facility]/news/fall-prevention-tips-to-avoid-injury OR  https://www.cdc.gov/steadi/patient.html For information on Fall & Injury Prevention, visit: https://www.[facility]/news/fall-prevention-protects-and-maintains-health-and-mobility OR  https://www.[facility]/news/fall-prevention-tips-to-avoid-injury OR  https://www.cdc.gov/steadi/patient.html For information on Fall & Injury Prevention, visit: https://www.[facility]/news/fall-prevention-protects-and-maintains-health-and-mobility OR  https://www.[facility]/news/fall-prevention-tips-to-avoid-injury OR  https://www.cdc.gov/steadi/patient.html

## 2023-05-19 NOTE — ECT AMBULATORY DISCHARGE PLAN - NSPOSTECTCALLZHH_PSY_ALL_CORE
Call the [facility] ECT suite at [phone] Call the [facility] ECT suite at [phone] Call the [facility] ECT suite at [phone]

## 2023-05-19 NOTE — ECT AMBULATORY DISCHARGE PLAN - NSPOSTECTCONTPROV_PSY_ALL_CORE
[facility] ([facility]) ECT Suite at [phone] [facility] ([facility]) ECT Suite at [phone] [facility] ([facility]) ECT Suite at [phone]

## 2023-05-19 NOTE — ECT TREATMENT NOTE - NSECTCPTCODE_PSY_ALL_CORE
56696 (ECT-Electroconvulsive Therapy) 12503 (ECT-Electroconvulsive Therapy) 15920 (ECT-Electroconvulsive Therapy)

## 2023-05-19 NOTE — ECT AMBULATORY DISCHARGE PLAN - PATIENT PORTAL LINK FT
You can access the FollowMyHealth Patient Portal offered by [facility] by registering at the following website: [email or web address]. By joining [employer]’s FollowMyHealth portal, you will also be able to view your health information using other applications (apps) compatible with our system. You can access the FollowMyHealth Patient Portal offered by [facility] by registering at the following website: [email or web address]. By joining [employer]’s FollowMyHealth portal, you will also be able to view your health information using other applications (apps) compatible with our system. You can access the FollowMyHealth Patient Portal offered by [facility] by registering at the following website: [email or web address]. By joining [employer]’s FollowMyHealth portal, you will also be able to view your health information using other applications (apps) compatible with our system.

## 2023-05-19 NOTE — ECT AMBULATORY DISCHARGE PLAN - NSPOSTECTCALLBEFORE_PSY_ALL_CORE
[facility] ([facility]) scheduling office at [phone] [facility] ([facility]) scheduling office at [phone] [facility] ([facility]) scheduling office at [phone]

## 2023-05-24 VITALS
RESPIRATION RATE: 16 BRPM | SYSTOLIC BLOOD PRESSURE: 134 MMHG | DIASTOLIC BLOOD PRESSURE: 88 MMHG | OXYGEN SATURATION: 95 % | HEART RATE: 70 BPM

## 2023-05-24 PROCEDURE — 90870 ELECTROCONVULSIVE THERAPY: CPT

## 2023-05-24 RX ORDER — MIDAZOLAM HYDROCHLORIDE 1 MG/ML
2 INJECTION, SOLUTION INTRAMUSCULAR; INTRAVENOUS ONCE
Refills: 0 | Status: DISCONTINUED | OUTPATIENT
Start: 2023-05-24 | End: 2023-05-24

## 2023-05-24 RX ADMIN — MIDAZOLAM HYDROCHLORIDE 2 MILLIGRAM(S): 1 INJECTION, SOLUTION INTRAMUSCULAR; INTRAVENOUS at 14:08

## 2023-05-24 NOTE — ECT AMBULATORY DISCHARGE PLAN - NSPOSTECTCONTPROV_PSY_ALL_CORE
[facility] ([facility]) ECT Suite at [phone] [facility] ([facility]) ECT Suite at [phone] [facility] ([facility]) ECT Suite at [phone]

## 2023-05-24 NOTE — ECT PRE-PROCEDURE CHECKLIST - NSDISPOFBELONG_PSY_ALL_CORE
not applicable
given to procedural RN
not applicable

## 2023-05-24 NOTE — ECT PRE-PROCEDURE CHECKLIST - INV PERIPH IV LOCATION
Right:/median cubital vein
Right:
Right:/median cubital vein
Right:/basilic vein
Right:/basilic vein
Right:/median cubital vein
Right:/median cubital vein
Right:/basilic vein
Right:/median cubital vein
Right:/basilic vein
Right:/median cubital vein
Right:/median cubital vein

## 2023-05-24 NOTE — ECT AMBULATORY DISCHARGE PLAN - NSPOSTECTCALLBEFORE_PSY_ALL_CORE
[facility] ([facility]) scheduling office at [phone] [facility] ([facility]) scheduling office at [phone] [facility] ([facility]) scheduling office at [phone]

## 2023-05-24 NOTE — ECT TREATMENT NOTE - NSECTCHANGEFREQ_PSY_ALL_CORE
No change
No change
Increase
No change
Decrease
No change

## 2023-05-24 NOTE — ECT PRE-PROCEDURE CHECKLIST - PATIENT'S SEXUAL ORIENTATION
Heterosexual

## 2023-05-24 NOTE — ECT AMBULATORY DISCHARGE PLAN - NSPOSTECTRESTRIC_PSY_ALL_CORE
Drive a car, operate power tools or machinery./Drink alcohol, beer, or wine./Make important personal and business decisions./If you have had any type of sedation, you may experience lightheadedness, dizziness, or sleepiness following your procedure.  A responsible adult should stay with you for at least 24 hours following your procedure.

## 2023-05-24 NOTE — ECT TREATMENT NOTE - NSSUICPROTFACT_PSY_ALL_CORE
Responsibility to children, family, or others/Identifies reasons for living/Supportive social network of family or friends/Engaged in work or school

## 2023-05-24 NOTE — ECT TREATMENT NOTE - NSICDXBHTERTIARYDX_PSY_ALL_CORE
R/O Alcohol abuse   F10.10  

## 2023-05-24 NOTE — ECT PRE-PROCEDURE CHECKLIST - PERIPHERAL IV: INSERTION DATE
19-May-2023
14-Apr-2023
18-Apr-2023
24-May-2023
26-Apr-2023
03-May-2023
12-May-2023
06-Apr-2023
11-Apr-2023
21-Apr-2023
17-May-2023
28-Apr-2023
03-Apr-2023
09-May-2023

## 2023-05-24 NOTE — ECT TREATMENT NOTE - NSECTRECTXSCHED_PSY_ALL_CORE
Acute Course 2X per week
Weekly
Weekly
Acute Course 2X per week

## 2023-05-24 NOTE — ECT PRE-PROCEDURE CHECKLIST - TO WHOM
treating RN
ECT treatment room RN

## 2023-05-24 NOTE — ECT PRE-PROCEDURE CHECKLIST - NSPROEDALEARNPREFOTH_GEN_A_NUR
verbal instruction/written material
verbal instruction
verbal instruction/written material

## 2023-05-24 NOTE — ECT AMBULATORY DISCHARGE PLAN - NSPOSTECTWORSEPSYCHSX_PSY_ALL_CORE
If you are experiencing heightened or worsening psychological symptoms please contact your private psychiatrist.

## 2023-05-24 NOTE — ECT AMBULATORY DISCHARGE PLAN - NSPOSTECTPROVEDUCFT_PSY_ALL_CORE
post ECT discharge instructions
Post ECT Education 
post ECT discharge instructions
post ECT discharge instructions, covid educational material 
ECT education
Covid teaching
COVID EDUCATION , POST ECT INSTRUCTIONS
post ECT discharge instructions, covid educational material 
Covid instruction. 
covid teaching  ect teaching
COVID education sheet
COVID EDUCATION , POST ECT INSTRUCTIONS
COVID EDUCATION , POST ECT INSTRUCTIONS
covid teaching  ect teaching

## 2023-05-24 NOTE — ECT PRE-PROCEDURE CHECKLIST - NSMEDSDOSETAKEN_PSY_ALL_CORE
No medication 
Pt told to bring in a current list and to take note of meds taken prior to ECT.  (from previous hx, no benzos) Took Lisinopril &  Metoprolol this am same today 4/6/23
No medication 
Pt told to bring in a current list and to take note of meds taken prior to ECT.  (from previous hx, no benzos) Took Lisinopril &  Metoprolol this am
Suloxetine 120 mg, Rexulti 1mg, Lisinopril 40mg
No medication 
No medication 
Suloxetine 120 mg, Rexulti 1mg, Lisinopril 40mg
Suloxetine 120 mg, Rexulti 1mg, Lisinopril 40mg
No medication 
Suloxetine 120 mg, Rexulti 1mg, Lisinopril 40mg
No medication

## 2023-05-24 NOTE — ECT AMBULATORY DISCHARGE PLAN - NSPOSTECTNEXTSCHTXDT_PSY_ALL_CORE
06-Apr-2023 15:00
14-Apr-2023 13:30
16-May-2023 15:30
09-May-2023 08:30
19-May-2023 08:45
12-May-2023 08:45
24-May-2023 13:30
02-Jun-2023 07:30
21-Apr-2023 08:30
18-Apr-2023 16:00
11-Apr-2023 15:00
03-May-2023 08:00
26-Apr-2023 08:30
28-Apr-2023 10:00

## 2023-05-24 NOTE — ECT AMBULATORY DISCHARGE PLAN - NSPOSTECTCASEEMER_PSY_ALL_CORE
In case of any emergency, please go to the nearest emergency room
[see HPI] : see HPI
[Negative] : Constitutional
In case of any emergency, please go to the nearest emergency room

## 2023-05-24 NOTE — ECT AMBULATORY DISCHARGE PLAN - MODE OF TRANSPORTATION
Wheelchair/Stroller
Ambulatory
Wheelchair/Stroller

## 2023-05-24 NOTE — ECT TREATMENT NOTE - NSCGISEVERILLNESS_PSY_ALL_CORE
4 = Moderately ill – overt symptoms causing noticeable, but modest, functional impairment or distress; symptom level may warrant medication
2 = Borderline mentally ill – subtle or suspected pathology
3 = Mildly ill – clearly established symptoms with minimal, if any, distress or difficulty in social and occupational function
4 = Moderately ill – overt symptoms causing noticeable, but modest, functional impairment or distress; symptom level may warrant medication
2 = Borderline mentally ill – subtle or suspected pathology
2 = Borderline mentally ill – subtle or suspected pathology
4 = Moderately ill – overt symptoms causing noticeable, but modest, functional impairment or distress; symptom level may warrant medication
5 = Markedly ill - intrusive symptoms that distinctly impair social/occupational function or cause intrusive levels of distress
3 = Mildly ill – clearly established symptoms with minimal, if any, distress or difficulty in social and occupational function

## 2023-05-24 NOTE — ECT AMBULATORY DISCHARGE PLAN - NSPOSTECTSYMPTOMS_PSY_ALL_CORE
Excessive Diarrhea/Fever/Inability to tolerate liquids or foods/Increased irritability or sluggishness/Nausea and vomiting that does not stop/Pain not relieved by medications/Unable to urinate

## 2023-05-24 NOTE — ECT TREATMENT NOTE - NSECTABDMETACOMMENT_PSY_ALL_CORE
sagastume's esophagus, GERD

## 2023-05-24 NOTE — ECT TREATMENT NOTE - NSECTCPTCODE_PSY_ALL_CORE
91281 (ECT-Electroconvulsive Therapy) 53104 (ECT-Electroconvulsive Therapy) 53669 (ECT-Electroconvulsive Therapy)

## 2023-05-24 NOTE — ECT PRE-PROCEDURE CHECKLIST - HAND OFF
Holding RN to OR RN

## 2023-05-24 NOTE — ECT TREATMENT NOTE - NSECTREVSYS_PSY_ALL_CORE
Cardiovascular/Pulmonary/Abdominal/Metabolic/Other

## 2023-05-24 NOTE — ECT TREATMENT NOTE - NSCGIIMPROVESX_PSY_ALL_CORE
2 = Much improved - notably better with signficant reduction of symptoms; increase in the level of functioning but some symptoms remain
2 = Much improved - notably better with signficant reduction of symptoms; increase in the level of functioning but some symptoms remain
4 = No change - symptoms remain essentially unchanged
2 = Much improved - notably better with signficant reduction of symptoms; increase in the level of functioning but some symptoms remain
3 = Minimally improved - slightly better with little or no clinically meaningful reduction of symptoms.  Represents very little change in basic clinical status, level of care, or functional capacity.
2 = Much improved - notably better with signficant reduction of symptoms; increase in the level of functioning but some symptoms remain

## 2023-05-24 NOTE — ECT PRE-PROCEDURE CHECKLIST - INV PERIPH IV SIZE
22 gauge
22 gauge/1.0 in length

## 2023-05-24 NOTE — ECT AMBULATORY DISCHARGE PLAN - NSPOSTECTFUPHCALL_PSY_ALL_CORE
You will receive a follow-up phone call from a nurse by the next business day after your treatment to ask how you are feeling.

## 2023-05-24 NOTE — ECT PRE-PROCEDURE CHECKLIST - NSECTNPODT_PSY_ALL_CORE
02-Apr-2023 20:00
11-Apr-2023 08:30
20-Apr-2023 20:00
25-Apr-2023 16:40
18-May-2023 20:00
27-Apr-2023 22:00
18-Apr-2023 12:00
23-May-2023 22:00
13-Apr-2023 20:20
11-May-2023 20:00
06-Apr-2023 05:00
16-May-2023 21:00
08-May-2023 23:30
02-May-2023 17:00

## 2023-05-24 NOTE — ECT AMBULATORY DISCHARGE PLAN - NSDCPEFALRISK_GEN_ALL_CORE
For information on Fall & Injury Prevention, visit: https://www.[facility]/news/fall-prevention-protects-and-maintains-health-and-mobility OR  https://www.[facility]/news/fall-prevention-tips-to-avoid-injury OR  https://www.cdc.gov/steadi/patient.html For information on Fall & Injury Prevention, visit: https://www.[facility]/news/fall-prevention-protects-and-maintains-health-and-mobility OR  https://www.[facility]/news/fall-prevention-tips-to-avoid-injury OR  https://www.cdc.gov/steadi/patient.html For information on Fall & Injury Prevention, visit: https://www.[facility]/news/fall-prevention-protects-and-maintains-health-and-mobility OR  https://www.[facility]/news/fall-prevention-tips-to-avoid-injury OR  https://www.cdc.gov/steadi/patient.html

## 2023-05-24 NOTE — ECT AMBULATORY DISCHARGE PLAN - PATIENT PORTAL LINK FT
You can access the FollowMyHealth Patient Portal offered by [facility] by registering at the following website: [email or web address]. By joining [employer]’s FollowMyHealth portal, you will also be able to view your health information using other applications (apps) compatible with our system. You can access the FollowMyHealth Patient Portal offered by [facility] by registering at the following website: [email or web address]. By joining [employer]’s FollowMyHealth portal, you will also be able to view your health information using other applications (apps) compatible with our system. You can access the FollowMyHealth Patient Portal offered by [facility] by registering at the following website: [email or web address]. By joining [employer]’s FollowMyHealth portal, you will also be able to view your health information using other applications (apps) compatible with our system.

## 2023-05-24 NOTE — ECT AMBULATORY DISCHARGE PLAN - NSPOSTECTCALLZHH_PSY_ALL_CORE
Call the [facility] ECT suite at [phone] Call the [facility] ECT suite at [phone] Call the [facility] ECT suite at [phone]

## 2023-05-24 NOTE — ECT AMBULATORY DISCHARGE PLAN - NSPROCPERF_PSY_ALL_CORE
Electroconvulsive Therapy (ECT)

## 2023-05-24 NOTE — ECT TREATMENT NOTE - NSECTOTHERCOMMENT_PSY_ALL_CORE
enlarged prostate

## 2023-05-24 NOTE — ECT TREATMENT NOTE - NSECTPOSTTXSUMMARY_PSY_ALL_CORE
The patient had a well modified grand mal seizure under general anesthesia and muscle relaxant.  The patient is alert, responsive, and in no acute distress. Recovery uneventful.
The patient had a well modified grand mal seizure under general anesthesia and muscle relaxant. The patient is alert, responsive, in no acute distress.  Recovery uneventful. 
The patient had a well modified grand mal seizure under general anesthesia and muscle relaxant. The patient is alert, responsive, in no acute distress.  Recovery uneventful. 
The patient had a well modified grand mal seizure under general anesthesia and muscle relaxant.  The patient is alert, responsive, and in no acute distress. Recovery uneventful.
The patient had a well modified grand mal seizure under general anesthesia and muscle relaxant. The patient is alert, responsive, in no acute distress.  Recovery uneventful. 
The patient had a well modified grand mal seizure under general anesthesia and muscle relaxant. The patient is alert, responsive, in no acute distress.  Recovery uneventful.
The patient had a well modified grand mal seizure under general anesthesia and muscle relaxant.  The patient is alert, responsive, and in no acute distress. Recovery uneventful.
The patient had a well modified grand mal seizure under general anesthesia and muscle relaxant. The patient is alert, responsive, in no acute distress.  Recovery uneventful. 
The patient had a well modified grand mal seizure under general anesthesia and muscle relaxant. The patient is alert, responsive, in no acute distress.  Recovery uneventful. 
The patient had a well modified grand mal seizure under general anesthesia and muscle relaxant.  The patient is alert, responsive, and in no acute distress. Recovery uneventful.
Suggest Etomidate shift
The patient had a well modified grand mal seizure under general anesthesia and muscle relaxant. The patient is alert, responsive, in no acute distress.  Recovery uneventful. 

## 2023-05-24 NOTE — ECT TREATMENT NOTE - NSECTFOCPELUNGS_PSY_ALL_CORE
Unremarkable
Clear bilaterally, pupils equal, round.
Unremarkable

## 2023-05-24 NOTE — ECT AMBULATORY DISCHARGE PLAN - NSDCPNDISPN_GEN_ALL_CORE
Education provided on the pain management plan of care
Education provided on the pain management plan of care/Side effects of pain management treatment/Activities of daily living, including home environment that might     exacerbate pain or reduce effectiveness of the pain management plan of care as well as strategies to address these issues/Safe use, storage and disposal of opioids when prescribed/Opioids not applicable/not prescribed
Education provided on the pain management plan of care
Education provided on the pain management plan of care
Education provided on the pain management plan of care/Side effects of pain management treatment/Activities of daily living, including home environment that might     exacerbate pain or reduce effectiveness of the pain management plan of care as well as strategies to address these issues/Safe use, storage and disposal of opioids when prescribed/Opioids not applicable/not prescribed
Education provided on the pain management plan of care
Education provided on the pain management plan of care

## 2023-05-24 NOTE — ECT PRE-PROCEDURE CHECKLIST - AS BP NONINV SITE
left upper arm
left upper arm
right upper arm
left upper arm
right upper arm
left upper arm
right upper arm
left upper arm
right upper arm

## 2023-05-24 NOTE — ECT TREATMENT NOTE - NS_RISKASSESSMENTINTER_PSY_ALL_CORE
Low Acute Suicide Risk

## 2023-05-24 NOTE — ECT AMBULATORY DISCHARGE PLAN - NSPOSTECTDIET_PSY_ALL_CORE
Gradually resume your regular diet/Increase fluids

## 2023-05-24 NOTE — ECT AMBULATORY DISCHARGE PLAN - NSECTPROCEDUREDATE_PSY_ALL_CORE
12-May-2023
18-Apr-2023
09-May-2023
21-Apr-2023
03-Apr-2023
17-May-2023
11-Apr-2023
19-May-2023
03-May-2023
03-Apr-2023
28-Apr-2023
24-May-2023
06-Apr-2023
14-Apr-2023
26-Apr-2023

## 2023-05-24 NOTE — ECT PRE-PROCEDURE CHECKLIST - ALLERGY BAND ON
no known allergies

## 2023-05-24 NOTE — ECT PRE-PROCEDURE CHECKLIST - INV PERIPH IV DEVICE
Angiocath

## 2023-05-24 NOTE — ECT PRE-PROCEDURE CHECKLIST - NSPTSENTTO_PSY_ALL_CORE
procedural room
holding area
holding area
procedural room
holding area
procedural room
procedural room
holding area
procedural room
procedural room

## 2023-05-24 NOTE — ECT PRE-PROCEDURE CHECKLIST - NSPTSENTVIA_PSY_ALL_CORE
stretcher
ambulate
stretcher
stretcher
ambulate
stretcher
ambulate
stretcher
ambulate

## 2023-05-24 NOTE — ECT PRE-PROCEDURE CHECKLIST - NSADULTACCOMPNAME_PSY_ALL_CORE
jill  brother
Sister   Cathy
jill  brother
Sister   Cathy
jill  brother
Cathy
Sister   Cathy
jill  brother
jill  brother

## 2023-05-24 NOTE — ECT PRE-PROCEDURE CHECKLIST - LAST TOOK
clears
solids
solids
clears
clears
solids
clears
clears
solids
clears
solids
solids
full liquids
solids

## 2023-05-24 NOTE — ECT AMBULATORY DISCHARGE PLAN - NSPOSTECTSMOKING_PSY_ALL_CORE
If you are a smoker, it is important for your health to stop smoking.  Please be aware that second hand smoke is also harmful.

## 2023-05-24 NOTE — ECT PRE-PROCEDURE CHECKLIST - 1.
Sore L upper teeth - was like this before ECT per pt.  Anesthesia MD to be made aware before tx

## 2023-05-24 NOTE — ECT PRE-PROCEDURE CHECKLIST - NSPROPTRIGHTNOTIFY_GEN_A_NUR
declines

## 2023-05-24 NOTE — ECT AMBULATORY DISCHARGE PLAN - NSPOSTECTPTCOND_PSY_ALL_CORE
Stable

## 2023-05-24 NOTE — ECT TREATMENT NOTE - NSECTCOMMENTS_PSY_ALL_CORE
Patient was well groomed, pleasant, cooperative.   He reports his mood has improved but not back to baseline. Continues to experience low mood. Sleep - "good with medications". Appetite - good. Denies suicidal thoughts.   Collateral from brother:   He shares patient informed him he is doing a little better, feels "Lighter".    Will continue with acute course. 
  Pt pleasant and friendly.  States he enjoyed Easter with family.   States he still feels very depressed including low energy/motivation.  Sleep and appetite good, no SI.  Will continue acute ECT. 
Pt is calm, pleasant and cooperative. However he reports that he does not feel himself, with poor energy and motivation, has to push himself at work. He rates himself at 50% of his normal self. No SI  We'll resume acute course
Pt reports sx similar to that described in initial consult w/ prominent low mood, anhedonia, passive SI (denied active SI, plan, intent, acts of furtherance, rehearsal behaviors, preparatory actions). states etoh use has been "up and down" but has largely been able to adhere to plan from outpt team of limiting etoh use to 1 beer/day. all questions answered and pt amenable to proceed with acute course.
Patient denies any mood or psychotic sx's, denies SI. NO changes since last treatment. Stable functioning, denies insomnia. Will decrease frequency to qweekly.
Pt pleasant and friendly.  States he still feels depressed including low energy/motivation.  Sleep and appetite good, no SI.  Will continue acute ECT.  Inquiring about when to expect to see some improvement and counseled about typical time to response.   
Well groomed and cooperative. Patient reported slight improvement, stated "I feel empty... where I would normally be" [ruminating]. No suicidal ideation. No adverse effects reported. 
Patient reported improvement since last visit but still with low motivation to go to work (did go), abstaining from ETOH with effort, low energy, moderate to severe anhedonia, no suicidal ideation, no auditory/visual hallucinations. Some mild cognitive impact. Agrees to continue acute ECT
Patient reported no changes since last visit; confirmed symptoms individually that he c/o to Dr. Leo. No substance use. No suicidal ideation. Minimal cognitive concerns. Agrees to continue acute course. 
  Tolerated 1st tx well with mild HA afterwards.  States he is still depressed and "not doing anything".  States he has not drank etoh in >1 week now.  No symptoms of withdrawal reported.  Wishes to continue acute ECT to try to treat depression not felt to be substance induced by outpt MD.  Pt agrees with plan.
Denies any current mood or psychotic sx's, denies SI. Maintaining sobriety, back at home, is working.  Will start continuation 
Patient reported feeling fairly well, denies changes in energy, motivation, anhedonia since last treatment two days ago. He hopes to re-engage in routine and noted that he cannot miss too much more work. Encouraged patient to see his primary psychiatrist early/mid next week for evaluation for possible spacing. 
No changes since last tx 2 days ago, overall improving, no suicidal ideation. Maintaining sobriety, planning to move back home from sister's house this weekend. 
Patient reported no changes in mood whatsoever, nor have others in his life reported changes in his demeanor. Presenting symptoms reviewed, all continue unabated. He denied side effects. States he has been clean and sober since last treatment. No active suicidal ideation, passive only

## 2023-05-24 NOTE — ECT AMBULATORY DISCHARGE PLAN - NSPOSTECTPOSSCOMP_PSY_ALL_CORE
Headache, nausea, general body aches are common experiences after this treatment.  These may be treated with over-the-counter pain medication.

## 2023-10-09 PROBLEM — Z00.00 ENCOUNTER FOR PREVENTIVE HEALTH EXAMINATION: Status: ACTIVE | Noted: 2023-10-09

## 2024-03-10 NOTE — ECT AMBULATORY DISCHARGE PLAN - NSPOSTECTCONTPROV_PSY_ALL_CORE
Yes [facility] ([facility]) ECT Suite at [phone] [facility] ([facility]) ECT Suite at [phone] [facility] ([facility]) ECT Suite at [phone]

## 2024-08-06 NOTE — ECT TREATMENT NOTE - NSECTTXPERFDATETIME_PSY_ALL_CORE
28-Apr-2023 09:57
12-May-2023 09:30
09-May-2023 09:15
03-Apr-2023 08:24
[FreeTextEntry1] : -  Discussed use of Omega 3 fish oil - Discussed use of medications as well as side effects if accommodations do not improve school performance - Discussed potential benefits of behavioral therapy and psychiatry, resources given to father 
26-Apr-2023 08:58
03-May-2023 08:41
24-May-2023 14:10
18-Apr-2023 16:30
21-Apr-2023 08:52
17-May-2023 08:56
11-Apr-2023 15:37
06-Apr-2023 15:32
19-May-2023 09:14
11-Apr-2023 15:37
